# Patient Record
Sex: MALE | Race: WHITE | NOT HISPANIC OR LATINO | Employment: OTHER | URBAN - NONMETROPOLITAN AREA
[De-identification: names, ages, dates, MRNs, and addresses within clinical notes are randomized per-mention and may not be internally consistent; named-entity substitution may affect disease eponyms.]

---

## 2021-02-04 ENCOUNTER — IMMUNIZATION (OUTPATIENT)
Dept: VACCINE CLINIC | Facility: HOSPITAL | Age: 76
End: 2021-02-04

## 2021-02-04 PROCEDURE — 91300 HC SARSCOV02 VAC 30MCG/0.3ML IM: CPT | Performed by: INTERNAL MEDICINE

## 2021-02-04 PROCEDURE — 0001A: CPT | Performed by: INTERNAL MEDICINE

## 2021-02-25 ENCOUNTER — IMMUNIZATION (OUTPATIENT)
Dept: VACCINE CLINIC | Facility: HOSPITAL | Age: 76
End: 2021-02-25

## 2021-02-25 PROCEDURE — 0002A: CPT | Performed by: INTERNAL MEDICINE

## 2021-02-25 PROCEDURE — 91300 HC SARSCOV02 VAC 30MCG/0.3ML IM: CPT | Performed by: INTERNAL MEDICINE

## 2021-10-04 ENCOUNTER — IMMUNIZATION (OUTPATIENT)
Dept: VACCINE CLINIC | Facility: HOSPITAL | Age: 76
End: 2021-10-04

## 2021-10-04 PROCEDURE — 0003A: CPT | Performed by: INTERNAL MEDICINE

## 2021-10-04 PROCEDURE — 0004A ADM SARSCOV2 30MCG/0.3ML BOOSTER: CPT | Performed by: INTERNAL MEDICINE

## 2021-10-04 PROCEDURE — 91300 HC SARSCOV02 VAC 30MCG/0.3ML IM: CPT | Performed by: INTERNAL MEDICINE

## 2024-04-30 ENCOUNTER — TELEPHONE (OUTPATIENT)
Dept: CARDIOLOGY | Facility: CLINIC | Age: 79
End: 2024-04-30
Payer: MEDICARE

## 2024-04-30 NOTE — TELEPHONE ENCOUNTER
Caller: cam cash    Relationship to patient: Emergency Contact    Best call back number: [phone]    Chief complaint:     Type of visit: NEW PATIENT    Requested date: ASAP     If rescheduling, when is the original appointment: 5.16.2024      Additional notes:DAUGHTER IS ADVISING PATIENT IS EXTREMELY SOB-CANNOT BEND OVER TO PUT HIS SHOES ON. PATIENT STOOD UP AND FELL BACK INTO THE CHAIR. ASKING IF THERE IS A SOONER APPOINTMENT. PLEASE CALL THE ABOVE TO RESCHEDULE.

## 2024-05-02 NOTE — TELEPHONE ENCOUNTER
Patient daughter Marlee called father gave permission to talk to her . She is going to have dad give permission for her to schedule apt.

## 2024-05-16 ENCOUNTER — OFFICE VISIT (OUTPATIENT)
Dept: CARDIOLOGY | Facility: CLINIC | Age: 79
End: 2024-05-16
Payer: MEDICARE

## 2024-05-16 VITALS
HEART RATE: 83 BPM | WEIGHT: 206 LBS | OXYGEN SATURATION: 97 % | BODY MASS INDEX: 27.3 KG/M2 | HEIGHT: 73 IN | SYSTOLIC BLOOD PRESSURE: 157 MMHG | DIASTOLIC BLOOD PRESSURE: 86 MMHG

## 2024-05-16 DIAGNOSIS — Z87.891 FORMER SMOKER: ICD-10-CM

## 2024-05-16 DIAGNOSIS — R06.02 SHORTNESS OF BREATH: ICD-10-CM

## 2024-05-16 DIAGNOSIS — R07.2 PRECORDIAL PAIN: ICD-10-CM

## 2024-05-16 DIAGNOSIS — I82.409 ACUTE DEEP VEIN THROMBOSIS (DVT) OF LOWER EXTREMITY, UNSPECIFIED LATERALITY, UNSPECIFIED VEIN: ICD-10-CM

## 2024-05-16 DIAGNOSIS — E78.2 MIXED HYPERLIPIDEMIA: Primary | ICD-10-CM

## 2024-05-16 PROBLEM — R00.2 PALPITATION: Status: RESOLVED | Noted: 2024-05-16 | Resolved: 2024-05-16

## 2024-05-16 PROBLEM — R00.2 PALPITATION: Status: ACTIVE | Noted: 2024-05-16

## 2024-05-16 RX ORDER — PRAVASTATIN SODIUM 20 MG
1 TABLET ORAL DAILY
COMMUNITY
Start: 2024-04-07 | End: 2024-05-16 | Stop reason: ALTCHOICE

## 2024-05-16 RX ORDER — ATORVASTATIN CALCIUM 40 MG/1
40 TABLET, FILM COATED ORAL DAILY
Qty: 30 TABLET | Refills: 11 | Status: SHIPPED | OUTPATIENT
Start: 2024-05-16

## 2024-05-16 RX ORDER — RIVAROXABAN 20 MG/1
1 TABLET, FILM COATED ORAL DAILY
COMMUNITY
Start: 2024-04-29

## 2024-05-16 RX ORDER — DIPHENOXYLATE HYDROCHLORIDE AND ATROPINE SULFATE 2.5; .025 MG/1; MG/1
1 TABLET ORAL DAILY
COMMUNITY

## 2024-05-16 RX ORDER — ASPIRIN 81 MG/1
81 TABLET ORAL DAILY
Qty: 30 TABLET | Refills: 11 | Status: SHIPPED | OUTPATIENT
Start: 2024-05-16

## 2024-05-16 RX ORDER — SERTRALINE HYDROCHLORIDE 100 MG/1
50 TABLET, FILM COATED ORAL DAILY
COMMUNITY
Start: 2024-04-07

## 2024-05-16 RX ORDER — PANTOPRAZOLE SODIUM 40 MG/1
1 TABLET, DELAYED RELEASE ORAL DAILY
COMMUNITY
Start: 2024-01-01

## 2024-05-16 RX ORDER — NITROGLYCERIN 0.4 MG/1
TABLET SUBLINGUAL
Qty: 25 TABLET | Refills: 2 | Status: SHIPPED | OUTPATIENT
Start: 2024-05-16

## 2024-05-16 RX ORDER — CELECOXIB 200 MG/1
1 CAPSULE ORAL DAILY
COMMUNITY
Start: 2024-01-10

## 2024-05-16 NOTE — PROGRESS NOTES
Subjective   Marcello Edwards is a 79 y.o. male     Chief Complaint   Patient presents with    Establish Care     Here for eval. C/p    Chest Pain    Palpitations    Hyperlipidemia    Shortness of Breath       PROBLEM LIST:     Chest Pain  Shortness of breath  3. Hyperlipidemia  4. DVT, several in past on Xarelto  5. Former smoker    Denies Rheumatic / Scarlet fever    Specialty Problems    None        HPI:    Mr. Marcello Barrett is a 79-year-old male patient of Dr. Chong and Carla Sykes seen today to establish care and for evaluation of symptoms.    Mr. Edwards was in his usual state of excellent health when, in March of this year, he developed chest discomfort.  He describes a mild upper retrosternal tightness or heaviness which waxed and waned for 2 days.  On the third day his symptoms seem to worsen and he presented for medical care.  He had no EKG changes at that time and arrangements were made for outpatient follow-up.  Mr. Edwards describes no associated nausea, diaphoresis, or shortness of breath with his chest discomfort.  Symptoms were nonpositional nonexertional and nonpleuritic.  However, he describes a general decline in functional capacity beginning about the time of the onset of symptoms.  He describes both increased fatigue and increased exertional dyspnea performing moderate levels of physical activity such as gardening.    The patient denies orthopnea, PND, or lower extremity edema.  He senses no palpitations, and he has no dizziness, presyncope, or syncope.  He describes no symptoms of peripheral arterial disease or of arterial embolic events.  Risk factors for coronary artery disease include an extremely strong family history, previous smoking, dyslipidemia, and, for possibly, hypertension.                  PRIOR MEDICATIONS    Current Outpatient Medications on File Prior to Visit   Medication Sig Dispense Refill    celecoxib (CeleBREX) 200 MG capsule Take 1 capsule by mouth Daily.      multivitamin  "(MULTIVITAMIN PO) Take 1 tablet by mouth Daily.      Omega-3 Fatty Acids (FISH OIL PO) Take  by mouth Daily.      pantoprazole (PROTONIX) 40 MG EC tablet Take 1 tablet by mouth Daily.      pravastatin (PRAVACHOL) 20 MG tablet Take 1 tablet by mouth Daily.      sertraline (ZOLOFT) 100 MG tablet Take 0.5 tablets by mouth Daily.      Xarelto 20 MG tablet Take 1 tablet by mouth Daily.       No current facility-administered medications on file prior to visit.       ALLERGIES:    Patient has no known allergies.    PAST MEDICAL HISTORY:    Past Medical History:   Diagnosis Date    Deep vein thrombosis     Hyperlipidemia        SURGICAL HISTORY:    Past Surgical History:   Procedure Laterality Date    CHOLECYSTECTOMY      KNEE SURGERY      arthritis and bone spun removal    ROTATOR CUFF REPAIR      SHOULDER SURGERY         SOCIAL HISTORY:    Social History     Socioeconomic History    Marital status:    Tobacco Use    Smoking status: Former     Types: Cigarettes    Smokeless tobacco: Former    Tobacco comments:     Used to smoke 2 PPD for approx. 25 yrs.    Substance and Sexual Activity    Alcohol use: Yes     Comment: occas. beer    Drug use: Never       FAMILY HISTORY:    Family History   Problem Relation Age of Onset    Cancer Mother     Emphysema Father        Review of Systems   Constitutional:  Positive for diaphoresis (occas. night sweats) and fatigue (more easily). Negative for chills, fever and unexpected weight change.   HENT: Negative.     Eyes:  Positive for visual disturbance (glasses prn).   Respiratory:  Positive for shortness of breath (increased) and wheezing.         Denies orthopnea/PND   Cardiovascular:  Positive for chest pain ('aggrevting dull pain\" had a couple of days and by day 3 went to immediate care for eval. No other symptoms. SL ntg. given at facility and pain relieved.). Negative for palpitations and leg swelling.   Gastrointestinal:  Negative for blood in stool (denies " "melena,hemoptysis), constipation and diarrhea.   Endocrine: Negative for cold intolerance and heat intolerance.   Genitourinary: Negative.    Musculoskeletal:  Positive for arthralgias and myalgias.        Denies leg cramps with ambulation, but can have at night   Skin: Negative.    Allergic/Immunologic: Negative.    Neurological: Negative.         Denies stroke like symptoms   Hematological:  Bruises/bleeds easily.   Psychiatric/Behavioral: Negative.         VISIT VITALS:  Vitals:    05/16/24 1137   BP: 157/86   BP Location: Left arm   Patient Position: Sitting   Pulse: 83   SpO2: 97%   Weight: 93.4 kg (206 lb)   Height: 185.4 cm (73\")      /86 (BP Location: Left arm, Patient Position: Sitting)   Pulse 83   Ht 185.4 cm (73\")   Wt 93.4 kg (206 lb)   SpO2 97%   BMI 27.18 kg/m²     RECENT LABS:    Objective       Physical Exam  Vitals and nursing note reviewed.   Constitutional:       General: He is not in acute distress.     Appearance: He is well-developed.   HENT:      Head: Normocephalic and atraumatic.   Eyes:      Conjunctiva/sclera: Conjunctivae normal.      Pupils: Pupils are equal, round, and reactive to light.   Neck:      Vascular: No carotid bruit, hepatojugular reflux or JVD.      Trachea: No tracheal deviation.      Comments: Nl. Carotid upstrokes  Cardiovascular:      Rate and Rhythm: Normal rate and regular rhythm.      Pulses:           Radial pulses are 2+ on the right side and 2+ on the left side.      Heart sounds: S1 normal and S2 normal. Murmur heard.      No friction rub. S4 (soft) sounds present.      Comments: 1/6 TR  NO MR  NO AI  Pulmonary:      Effort: Pulmonary effort is normal.      Breath sounds: Normal breath sounds. No wheezing, rhonchi or rales.      Comments: Nl. Expir. Phase  Nl. Breath sound intensity    Abdominal:      General: Bowel sounds are normal. There is no distension or abdominal bruit.      Palpations: Abdomen is soft. There is no mass.      Tenderness: There " is no abdominal tenderness. There is no guarding or rebound.      Comments: No organomegaly   Musculoskeletal:         General: No tenderness or deformity. Normal range of motion.      Cervical back: Normal range of motion and neck supple.      Right lower leg: No edema.      Left lower leg: Edema present.      Comments: LLE, trace edema, excellent pedal pulses, severe candelario. Stasis changes  RLE, no edema, palpable pedal pulses, mod. Candelario. Stasis changes, 4 mm erythematous raised area on the dorsum of rt. Foot  Mild hand tremor   Skin:     General: Skin is warm and dry.      Coloration: Skin is not pale.      Findings: No erythema or rash.   Neurological:      Mental Status: He is alert and oriented to person, place, and time.   Psychiatric:         Behavior: Behavior normal.         Thought Content: Thought content normal.         Judgment: Judgment normal.           ECG 12 Lead    Date/Time: 5/16/2024 11:47 AM  Performed by: Thomas Yoder MD    Authorized by: Thomas Yoder MD  Previous ECG: no previous ECG available  Comments: Sinus at 74 bpm.  Borderline criteria for left ventricular hypertrophy.  Early repolarization changes in the inferior leads.  RSR prime in V1 felt to be a normal variant.            Assessment & Plan   #1.  Mr. Edwards describes chest pain atypical for angina with some features compatible with ischemia.  Is felt to be at high risk for coronary artery disease.  We will risk stratify with stress testing with the need for further evaluation dictated on test findings and clinical course.    2.  Given the patient's high clinical risk I would like to start empiric therapy.  Will start aspirin 81 mg a day, metoprolol 12.5 mg twice daily, increase statin to a atorvastatin 40 mg daily, and the patient is given a prescription for sublingual nitroglycerin with instructions in its use.    3.  Increased exertional dyspnea and decreased functional capacity.  We will utilize echocardiography to  assess LV systolic and diastolic performance, LV filling pressures and pulmonary pressures.    4.  History of postoperative DVT.  Mr. Edwards had a recurrence of symptoms off coagulation.  Therefore chronic anticoagulation will be continued.    5.  The patient will follow with Dr. Chong per his instructions we will plan on seeing him in follow-up after testing or on appearing basis as discussed.   Diagnosis Plan   1. Mixed hyperlipidemia        2. Palpitation        3. Precordial pain        4. Acute deep vein thrombosis (DVT) of lower extremity, unspecified laterality, unspecified vein        5. Shortness of breath        6. Former smoker            No follow-ups on file.         Marcello Edwards  reports that he has quit smoking. His smoking use included cigarettes. He has quit using smokeless tobacco. I have educated him on the risk of diseases from using tobacco products such as cancer, COPD, and heart disease.     Advance Care Planning   ACP discussion was held with the patient during this visit. Patient has an advance directive (not in EMR), copy requested.              BMI is >= 25 and <30. (Overweight) The following options were offered after discussion;: pcp addressing             Electronically signed by:    Scribed for Thomas Yoder MD by Iwona Briseno LPN on May 16, 2024  at 11:46 EDT    I, Thomas Yoder MD personally performed the services described in this documentation as scribed by the above named individual in my presence, and it is both accurate and complete. May 16, 2024 11:46 EDT      Dictated Utilizing Dragon Dictation: Part of this note may be an electronic transcription/translation of spoken language to printed text using the Dragon Dictation System.

## 2024-05-31 ENCOUNTER — HOSPITAL ENCOUNTER (OUTPATIENT)
Dept: CARDIOLOGY | Facility: HOSPITAL | Age: 79
Discharge: HOME | End: 2024-05-31
Payer: MEDICARE

## 2024-05-31 DIAGNOSIS — R07.2 PRECORDIAL PAIN: ICD-10-CM

## 2024-05-31 DIAGNOSIS — Z87.891 FORMER SMOKER: ICD-10-CM

## 2024-05-31 DIAGNOSIS — R06.02 SHORTNESS OF BREATH: ICD-10-CM

## 2024-05-31 DIAGNOSIS — E78.2 MIXED HYPERLIPIDEMIA: ICD-10-CM

## 2024-05-31 LAB
BH CV ECHO MEAS - ACS: 2.16 CM
BH CV ECHO MEAS - AO MAX PG: 4.4 MMHG
BH CV ECHO MEAS - AO MEAN PG: 2.7 MMHG
BH CV ECHO MEAS - AO ROOT DIAM: 3.4 CM
BH CV ECHO MEAS - AO V2 MAX: 104.5 CM/SEC
BH CV ECHO MEAS - AO V2 VTI: 27.5 CM
BH CV ECHO MEAS - EDV(CUBED): 80.1 ML
BH CV ECHO MEAS - EDV(MOD-SP4): 100 ML
BH CV ECHO MEAS - EF(MOD-SP4): 55.8 %
BH CV ECHO MEAS - EF_3D-VOL: 55 %
BH CV ECHO MEAS - ESV(CUBED): 27 ML
BH CV ECHO MEAS - ESV(MOD-SP4): 44.2 ML
BH CV ECHO MEAS - FS: 30.4 %
BH CV ECHO MEAS - IVS/LVPW: 0.99 CM
BH CV ECHO MEAS - IVSD: 1.42 CM
BH CV ECHO MEAS - LA DIMENSION: 4.3 CM
BH CV ECHO MEAS - LAT PEAK E' VEL: 9.7 CM/SEC
BH CV ECHO MEAS - LV DIASTOLIC VOL/BSA (35-75): 45.9 CM2
BH CV ECHO MEAS - LV MASS(C)D: 239.4 GRAMS
BH CV ECHO MEAS - LV SYSTOLIC VOL/BSA (12-30): 20.3 CM2
BH CV ECHO MEAS - LVIDD: 4.3 CM
BH CV ECHO MEAS - LVIDS: 3 CM
BH CV ECHO MEAS - LVPWD: 1.43 CM
BH CV ECHO MEAS - MED PEAK E' VEL: 7.2 CM/SEC
BH CV ECHO MEAS - MV A MAX VEL: 78 CM/SEC
BH CV ECHO MEAS - MV DEC SLOPE: 316.1 CM/SEC2
BH CV ECHO MEAS - MV E MAX VEL: 87 CM/SEC
BH CV ECHO MEAS - MV E/A: 1.12
BH CV ECHO MEAS - RAP SYSTOLE: 10 MMHG
BH CV ECHO MEAS - RVDD: 3 CM
BH CV ECHO MEAS - RVSP: 32.6 MMHG
BH CV ECHO MEAS - SV(MOD-SP4): 55.8 ML
BH CV ECHO MEAS - SVI(MOD-SP4): 25.6 ML/M2
BH CV ECHO MEAS - TR MAX PG: 22.6 MMHG
BH CV ECHO MEAS - TR MAX VEL: 237.5 CM/SEC
BH CV ECHO MEASUREMENTS AVERAGE E/E' RATIO: 10.3
BH CV REST NUCLEAR ISOTOPE DOSE: 10 MCI
BH CV STRESS COMMENTS STAGE 1: NORMAL
BH CV STRESS DOSE REGADENOSON STAGE 1: 0.4
BH CV STRESS DURATION MIN STAGE 1: 0
BH CV STRESS DURATION SEC STAGE 1: 10
BH CV STRESS NUCLEAR ISOTOPE DOSE: 30 MCI
BH CV STRESS PROTOCOL 1: NORMAL
BH CV STRESS RECOVERY BP: NORMAL MMHG
BH CV STRESS RECOVERY HR: 65 BPM
BH CV STRESS STAGE 1: 1
LEFT ATRIUM VOLUME INDEX: 31.3 ML/M2
MAXIMAL PREDICTED HEART RATE: 143 BPM
PERCENT MAX PREDICTED HR: 53.15 %
STRESS BASELINE BP: NORMAL MMHG
STRESS BASELINE HR: 64 BPM
STRESS PERCENT HR: 63 %
STRESS POST PEAK BP: NORMAL MMHG
STRESS POST PEAK HR: 76 BPM
STRESS TARGET HR: 122 BPM

## 2024-05-31 PROCEDURE — 0 TECHNETIUM SESTAMIBI: Performed by: INTERNAL MEDICINE

## 2024-05-31 PROCEDURE — 78452 HT MUSCLE IMAGE SPECT MULT: CPT

## 2024-05-31 PROCEDURE — A9500 TC99M SESTAMIBI: HCPCS | Performed by: INTERNAL MEDICINE

## 2024-05-31 PROCEDURE — 25010000002 REGADENOSON 0.4 MG/5ML SOLUTION: Performed by: INTERNAL MEDICINE

## 2024-05-31 PROCEDURE — 93306 TTE W/DOPPLER COMPLETE: CPT

## 2024-05-31 PROCEDURE — 93017 CV STRESS TEST TRACING ONLY: CPT

## 2024-05-31 RX ORDER — REGADENOSON 0.08 MG/ML
0.4 INJECTION, SOLUTION INTRAVENOUS
Status: COMPLETED | OUTPATIENT
Start: 2024-05-31 | End: 2024-05-31

## 2024-05-31 RX ADMIN — TECHNETIUM TC 99M SESTAMIBI 1 DOSE: 1 INJECTION INTRAVENOUS at 10:36

## 2024-05-31 RX ADMIN — TECHNETIUM TC 99M SESTAMIBI 1 DOSE: 1 INJECTION INTRAVENOUS at 08:23

## 2024-05-31 RX ADMIN — REGADENOSON 0.4 MG: 0.08 INJECTION, SOLUTION INTRAVENOUS at 10:36

## 2024-06-04 ENCOUNTER — TELEPHONE (OUTPATIENT)
Dept: CARDIOLOGY | Facility: CLINIC | Age: 79
End: 2024-06-04
Payer: MEDICARE

## 2024-06-04 NOTE — TELEPHONE ENCOUNTER
Stress test briefly discussed with Mr. Edwards and aware office will call him with a sooner 2 week appt. Verbalized ok. Msg. Sent to NANCIE Solis to schedule and call. PH,LPN          ----- Message from Thomas Yoder sent at 6/4/2024  9:13 AM EDT -----  Needs 2 week f/u  ----- Message -----  From: Thomas Yoder MD  Sent: 5/31/2024   5:15 PM EDT  To: Thomas Yoder MD

## 2024-06-05 LAB
BH CV ECHO MEAS - ACS: 2.16 CM
BH CV ECHO MEAS - AO MAX PG: 4.4 MMHG
BH CV ECHO MEAS - AO MEAN PG: 2.7 MMHG
BH CV ECHO MEAS - AO ROOT DIAM: 3.4 CM
BH CV ECHO MEAS - AO V2 MAX: 104.5 CM/SEC
BH CV ECHO MEAS - AO V2 VTI: 27.5 CM
BH CV ECHO MEAS - EDV(CUBED): 80.1 ML
BH CV ECHO MEAS - EDV(MOD-SP4): 100 ML
BH CV ECHO MEAS - EF(MOD-SP4): 55.8 %
BH CV ECHO MEAS - EF_3D-VOL: 55 %
BH CV ECHO MEAS - ESV(CUBED): 27 ML
BH CV ECHO MEAS - ESV(MOD-SP4): 44.2 ML
BH CV ECHO MEAS - FS: 30.4 %
BH CV ECHO MEAS - IVS/LVPW: 0.99 CM
BH CV ECHO MEAS - IVSD: 1.42 CM
BH CV ECHO MEAS - LA DIMENSION: 4.3 CM
BH CV ECHO MEAS - LAT PEAK E' VEL: 9.7 CM/SEC
BH CV ECHO MEAS - LV DIASTOLIC VOL/BSA (35-75): 45.9 CM2
BH CV ECHO MEAS - LV MASS(C)D: 239.4 GRAMS
BH CV ECHO MEAS - LV SYSTOLIC VOL/BSA (12-30): 20.3 CM2
BH CV ECHO MEAS - LVIDD: 4.3 CM
BH CV ECHO MEAS - LVIDS: 3 CM
BH CV ECHO MEAS - LVPWD: 1.43 CM
BH CV ECHO MEAS - MED PEAK E' VEL: 7.2 CM/SEC
BH CV ECHO MEAS - MV A MAX VEL: 78 CM/SEC
BH CV ECHO MEAS - MV DEC SLOPE: 316.1 CM/SEC2
BH CV ECHO MEAS - MV E MAX VEL: 87 CM/SEC
BH CV ECHO MEAS - MV E/A: 1.12
BH CV ECHO MEAS - RAP SYSTOLE: 10 MMHG
BH CV ECHO MEAS - RVDD: 3 CM
BH CV ECHO MEAS - RVSP: 32.6 MMHG
BH CV ECHO MEAS - SV(MOD-SP4): 55.8 ML
BH CV ECHO MEAS - SVI(MOD-SP4): 25.6 ML/M2
BH CV ECHO MEAS - TR MAX PG: 22.6 MMHG
BH CV ECHO MEAS - TR MAX VEL: 237.5 CM/SEC
BH CV ECHO MEASUREMENTS AVERAGE E/E' RATIO: 10.3
LEFT ATRIUM VOLUME INDEX: 31.3 ML/M2

## 2024-06-19 ENCOUNTER — OFFICE VISIT (OUTPATIENT)
Dept: CARDIOLOGY | Facility: CLINIC | Age: 79
End: 2024-06-19
Payer: MEDICARE

## 2024-06-19 VITALS
SYSTOLIC BLOOD PRESSURE: 163 MMHG | BODY MASS INDEX: 27.41 KG/M2 | HEIGHT: 73 IN | HEART RATE: 54 BPM | WEIGHT: 206.8 LBS | DIASTOLIC BLOOD PRESSURE: 73 MMHG | OXYGEN SATURATION: 96 %

## 2024-06-19 DIAGNOSIS — I82.409 ACUTE DEEP VEIN THROMBOSIS (DVT) OF LOWER EXTREMITY, UNSPECIFIED LATERALITY, UNSPECIFIED VEIN: ICD-10-CM

## 2024-06-19 DIAGNOSIS — R06.02 SHORTNESS OF BREATH: ICD-10-CM

## 2024-06-19 DIAGNOSIS — R94.39 ABNORMAL STRESS TEST: ICD-10-CM

## 2024-06-19 DIAGNOSIS — R07.2 PRECORDIAL PAIN: ICD-10-CM

## 2024-06-19 DIAGNOSIS — E78.2 MIXED HYPERLIPIDEMIA: Primary | ICD-10-CM

## 2024-06-19 DIAGNOSIS — I10 ESSENTIAL HYPERTENSION: ICD-10-CM

## 2024-06-19 DIAGNOSIS — Z87.891 FORMER SMOKER: ICD-10-CM

## 2024-06-19 PROCEDURE — 99213 OFFICE O/P EST LOW 20 MIN: CPT | Performed by: INTERNAL MEDICINE

## 2024-06-19 RX ORDER — METOPROLOL SUCCINATE 25 MG/1
25 TABLET, EXTENDED RELEASE ORAL DAILY
Qty: 30 TABLET | Refills: 11 | Status: SHIPPED | OUTPATIENT
Start: 2024-06-19

## 2024-06-19 RX ORDER — AMLODIPINE BESYLATE 2.5 MG/1
2.5 TABLET ORAL DAILY
Qty: 30 TABLET | Refills: 11 | Status: SHIPPED | OUTPATIENT
Start: 2024-06-19

## 2024-06-19 NOTE — PROGRESS NOTES
"163 Subjective   Marcello Edwards is a 79 y.o. male     Chief Complaint   Patient presents with    Follow-up     Here for abnl. Stress test    Hyperlipidemia    Chest Pain    Shortness of Breath       PROBLEM LIST:     Chest Pain  1.1 Stress test, 5-. Scintigraphy demonstrates a large but only mildly and incompletely reversible defect involving the inferoseptal wall, the inferior wall, and the inferolateral wall. Findings most likely represent artifact but multivessel ischemia cannot be excluded. Reversibility is most notable in the lateral wall.   Shortness of breath  3. Hyperlipidemia  4. DVT, several in past on Xarelto  5. Former smoker  6. Echo, 5-. EF 50-55%, mild LVH, grade 1 DD, trivial MR / TR / AI, pulm. Pressures low 30's     Denies Rheumatic / Scarlet fever    Specialty Problems          Cardiology Problems    Deep venous thrombosis        Mixed hyperlipidemia             HPI:  Mr. Edwards returns for follow-up on testing.    He continues to have chest \"pressure\" with activity predominantly soon after he awakens in the morning.  He brings in a blood pressure log that demonstrates persistently elevated blood pressures at that time.  He also describes morning headache which resolves once he becomes physically active.    Echo demonstrated an ejection fraction of 55%.  Inferoseptal hypokinesis could not be excluded.  There is grade 1 diastolic dysfunction with RV and PA systolic pressures in the low 30s and with no significant valve, pericardial, or great vessel pathology.    Stress test demonstrated and inferoseptal, inferior, and inferolateral partially reversible defect.  Findings were equivocal for ischemia versus diaphragmatic attenuation.  There were no high risk markers.    We had discussion today about further diagnostic and therapeutic strategies.  As the patient had no high risk stress test findings empiric therapy for ischemia and risk modification would be appropriate.  However, Mr." Grace states that he wants to resume high-level physical activity and would prefer invasive evaluation for definitive assessment and to treat if warranted.                      PRIOR MEDICATIONS    Current Outpatient Medications on File Prior to Visit   Medication Sig Dispense Refill    aspirin 81 MG EC tablet Take 1 tablet by mouth Daily. 30 tablet 11    atorvastatin (LIPITOR) 40 MG tablet Take 1 tablet by mouth Daily. 30 tablet 11    celecoxib (CeleBREX) 200 MG capsule Take 1 capsule by mouth Daily.      metoprolol tartrate (LOPRESSOR) 25 MG tablet Take 0.5 tablets by mouth 2 (Two) Times a Day. (Patient taking differently: Take 1 tablet by mouth Daily.) 30 tablet 11    multivitamin (MULTIVITAMIN PO) Take 1 tablet by mouth Daily.      Omega-3 Fatty Acids (FISH OIL PO) Take  by mouth Daily.      pantoprazole (PROTONIX) 40 MG EC tablet Take 1 tablet by mouth Daily.      sertraline (ZOLOFT) 100 MG tablet Take 0.5 tablets by mouth Daily.      Xarelto 20 MG tablet Take 1 tablet by mouth Daily.      nitroglycerin (NITROSTAT) 0.4 MG SL tablet 1 under the tongue as needed for angina, may repeat q5mins for up three doses within 15 minutes (Patient not taking: Reported on 6/19/2024) 25 tablet 2     No current facility-administered medications on file prior to visit.       ALLERGIES:    Patient has no known allergies.    PAST MEDICAL HISTORY:    Past Medical History:   Diagnosis Date    Deep vein thrombosis     Hyperlipidemia        SURGICAL HISTORY:    Past Surgical History:   Procedure Laterality Date    CHOLECYSTECTOMY      KNEE SURGERY      arthritis and bone spun removal    ROTATOR CUFF REPAIR      SHOULDER SURGERY         SOCIAL HISTORY:    Social History     Socioeconomic History    Marital status:    Tobacco Use    Smoking status: Former     Types: Cigarettes    Smokeless tobacco: Former    Tobacco comments:     Used to smoke 2 PPD for approx. 25 yrs.    Substance and Sexual Activity    Alcohol use: Yes      "Comment: occas. beer    Drug use: Never       FAMILY HISTORY:    Family History   Problem Relation Age of Onset    Cancer Mother     Emphysema Father        Review of Systems   Constitutional: Negative.    HENT: Negative.     Eyes:  Positive for visual disturbance (reading glasses).   Respiratory:  Positive for shortness of breath.    Cardiovascular:  Positive for chest pain (pressure). Negative for palpitations and leg swelling.   Gastrointestinal: Negative.    Endocrine: Negative.    Genitourinary: Negative.    Musculoskeletal:  Positive for arthralgias and myalgias.   Skin: Negative.    Allergic/Immunologic: Negative.    Neurological: Negative.    Hematological:  Bruises/bleeds easily.   Psychiatric/Behavioral: Negative.         VISIT VITALS:  Vitals:    06/19/24 1004   Pulse: 54   SpO2: 96%   Weight: 93.8 kg (206 lb 12.8 oz)   Height: 185.4 cm (72.99\")      Pulse 54   Ht 185.4 cm (72.99\")   Wt 93.8 kg (206 lb 12.8 oz)   SpO2 96%   BMI 27.29 kg/m²     RECENT LABS:    Objective       Physical Exam    Procedures      Assessment & Plan   #1.  Chest discomfort.  See discussion under history of present illness above.  We will schedule the patient for cardiac catheterization.  He was again reminded to activate emergency medical services for any chest pain not rapidly relieved by nitroglycerin.    2.  Systemic hypertension.  Blood pressures remain elevated.  We will change metoprolol to succinate as the patient was taking tartrate only once daily and we will add amlodipine 2.5 mg daily.  The patient was given precautions reference edema and orthostasis.    3.  Mr. Edwards will follow-up with Dr. Chong as instructed with further recommendations from our office based on the findings of cardiac catheterization.   Diagnosis Plan   1. Mixed hyperlipidemia        2. Precordial pain        3. Acute deep vein thrombosis (DVT) of lower extremity, unspecified laterality, unspecified vein        4. Shortness of breath        5. " Former smoker            No follow-ups on file.         Marcello Edwards  reports that he has quit smoking. His smoking use included cigarettes. He has quit using smokeless tobacco. I have educated him on the risk of diseases from using tobacco products such as cancer, COPD, and heart disease.       Advance Care Planning   ACP discussion was held with the patient during this visit. Patient has an advance directive (not in EMR), copy requested.                            Electronically signed by:    Scribed for Thomas Yoder MD by Iwona Briseno LPN on June 19, 2024  at 10:08 EDT    I, Thomas Yoder MD personally performed the services described in this documentation as scribed by the above named individual in my presence, and it is both accurate and complete. June 19, 2024 10:08 EDT      Dictated Utilizing Dragon Dictation: Part of this note may be an electronic transcription/translation of spoken language to printed text using the Dragon Dictation System.

## 2024-06-26 ENCOUNTER — TELEPHONE (OUTPATIENT)
Dept: CARDIOLOGY | Facility: CLINIC | Age: 79
End: 2024-06-26
Payer: MEDICARE

## 2024-07-19 ENCOUNTER — OUTSIDE FACILITY SERVICE (OUTPATIENT)
Dept: CARDIOLOGY | Facility: CLINIC | Age: 79
End: 2024-07-19
Payer: MEDICARE

## 2024-08-13 ENCOUNTER — TELEPHONE (OUTPATIENT)
Dept: CARDIOLOGY | Facility: CLINIC | Age: 79
End: 2024-08-13
Payer: MEDICARE